# Patient Record
Sex: MALE | Race: WHITE | NOT HISPANIC OR LATINO | URBAN - METROPOLITAN AREA
[De-identification: names, ages, dates, MRNs, and addresses within clinical notes are randomized per-mention and may not be internally consistent; named-entity substitution may affect disease eponyms.]

---

## 2017-12-13 ENCOUNTER — PROBLEM (OUTPATIENT)
Dept: URBAN - METROPOLITAN AREA CLINIC 14 | Facility: CLINIC | Age: 72
End: 2017-12-13

## 2017-12-13 DIAGNOSIS — H43.811: ICD-10-CM

## 2017-12-13 DIAGNOSIS — H35.353: ICD-10-CM

## 2017-12-13 DIAGNOSIS — H35.373: ICD-10-CM

## 2017-12-13 PROCEDURE — 92235 FLUORESCEIN ANGRPH MLTIFRAME: CPT

## 2017-12-13 PROCEDURE — G8427 DOCREV CUR MEDS BY ELIG CLIN: HCPCS

## 2017-12-13 PROCEDURE — 1036F TOBACCO NON-USER: CPT

## 2017-12-13 PROCEDURE — 92134 CPTRZ OPH DX IMG PST SGM RTA: CPT

## 2017-12-13 PROCEDURE — 92014 COMPRE OPH EXAM EST PT 1/>: CPT

## 2017-12-13 PROCEDURE — 92250 FUNDUS PHOTOGRAPHY W/I&R: CPT

## 2017-12-13 PROCEDURE — 92226 OPHTHALMOSCOPY (SUB): CPT

## 2017-12-13 ASSESSMENT — TONOMETRY
OS_IOP_MMHG: 10
OD_IOP_MMHG: 10

## 2017-12-13 ASSESSMENT — VISUAL ACUITY
OS_CC: 20/50
OD_CC: 20/25+2

## 2018-01-10 ENCOUNTER — FOLLOW UP (OUTPATIENT)
Dept: URBAN - METROPOLITAN AREA CLINIC 14 | Facility: CLINIC | Age: 73
End: 2018-01-10

## 2018-01-10 DIAGNOSIS — H43.811: ICD-10-CM

## 2018-01-10 DIAGNOSIS — D31.32: ICD-10-CM

## 2018-01-10 DIAGNOSIS — H43.21: ICD-10-CM

## 2018-01-10 DIAGNOSIS — H35.373: ICD-10-CM

## 2018-01-10 DIAGNOSIS — H35.353: ICD-10-CM

## 2018-01-10 PROCEDURE — 1036F TOBACCO NON-USER: CPT

## 2018-01-10 PROCEDURE — 92226 OPHTHALMOSCOPY (SUB): CPT

## 2018-01-10 PROCEDURE — 92134 CPTRZ OPH DX IMG PST SGM RTA: CPT

## 2018-01-10 PROCEDURE — G8427 DOCREV CUR MEDS BY ELIG CLIN: HCPCS

## 2018-01-10 PROCEDURE — 92014 COMPRE OPH EXAM EST PT 1/>: CPT

## 2018-01-10 ASSESSMENT — VISUAL ACUITY
OS_CC: 20/30
OD_CC: 20/20

## 2018-01-10 ASSESSMENT — TONOMETRY
OS_IOP_MMHG: 16
OD_IOP_MMHG: 12

## 2018-02-14 ENCOUNTER — FOLLOW UP (OUTPATIENT)
Dept: URBAN - METROPOLITAN AREA CLINIC 14 | Facility: CLINIC | Age: 73
End: 2018-02-14

## 2018-02-14 DIAGNOSIS — H35.373: ICD-10-CM

## 2018-02-14 DIAGNOSIS — H35.353: ICD-10-CM

## 2018-02-14 DIAGNOSIS — H43.811: ICD-10-CM

## 2018-02-14 PROCEDURE — 1036F TOBACCO NON-USER: CPT

## 2018-02-14 PROCEDURE — 92014 COMPRE OPH EXAM EST PT 1/>: CPT

## 2018-02-14 PROCEDURE — 92226 OPHTHALMOSCOPY (SUB): CPT

## 2018-02-14 PROCEDURE — G8427 DOCREV CUR MEDS BY ELIG CLIN: HCPCS

## 2018-02-14 PROCEDURE — 92134 CPTRZ OPH DX IMG PST SGM RTA: CPT

## 2018-02-14 ASSESSMENT — TONOMETRY
OD_IOP_MMHG: 11
OS_IOP_MMHG: 13

## 2018-02-14 ASSESSMENT — VISUAL ACUITY
OD_CC: 20/20-1
OS_CC: 20/30-1

## 2018-03-20 ENCOUNTER — FOLLOW UP (OUTPATIENT)
Dept: URBAN - METROPOLITAN AREA CLINIC 14 | Facility: CLINIC | Age: 73
End: 2018-03-20

## 2018-03-20 DIAGNOSIS — H43.811: ICD-10-CM

## 2018-03-20 DIAGNOSIS — H35.373: ICD-10-CM

## 2018-03-20 DIAGNOSIS — H35.352: ICD-10-CM

## 2018-03-20 PROCEDURE — 4040F PNEUMOC VAC/ADMIN/RCVD: CPT

## 2018-03-20 PROCEDURE — 92014 COMPRE OPH EXAM EST PT 1/>: CPT

## 2018-03-20 PROCEDURE — 92134 CPTRZ OPH DX IMG PST SGM RTA: CPT

## 2018-03-20 PROCEDURE — G8427 DOCREV CUR MEDS BY ELIG CLIN: HCPCS

## 2018-03-20 PROCEDURE — 92226 OPHTHALMOSCOPY (SUB): CPT

## 2018-03-20 PROCEDURE — 1036F TOBACCO NON-USER: CPT

## 2018-03-20 ASSESSMENT — VISUAL ACUITY
OD_CC: 20/20
OS_CC: 20/30-2

## 2018-03-20 ASSESSMENT — TONOMETRY
OD_IOP_MMHG: 12
OS_IOP_MMHG: 15

## 2018-05-01 ENCOUNTER — FOLLOW UP (OUTPATIENT)
Dept: URBAN - METROPOLITAN AREA CLINIC 14 | Facility: CLINIC | Age: 73
End: 2018-05-01

## 2018-05-01 DIAGNOSIS — H43.811: ICD-10-CM

## 2018-05-01 DIAGNOSIS — H35.373: ICD-10-CM

## 2018-05-01 DIAGNOSIS — H35.352: ICD-10-CM

## 2018-05-01 PROCEDURE — 92226 OPHTHALMOSCOPY (SUB): CPT

## 2018-05-01 PROCEDURE — 92014 COMPRE OPH EXAM EST PT 1/>: CPT

## 2018-05-01 PROCEDURE — 1036F TOBACCO NON-USER: CPT

## 2018-05-01 PROCEDURE — 92134 CPTRZ OPH DX IMG PST SGM RTA: CPT

## 2018-05-01 PROCEDURE — G8427 DOCREV CUR MEDS BY ELIG CLIN: HCPCS

## 2018-05-01 ASSESSMENT — VISUAL ACUITY
OD_CC: 20/20-2
OS_CC: 20/40

## 2018-05-01 ASSESSMENT — TONOMETRY
OS_IOP_MMHG: 21
OD_IOP_MMHG: 17

## 2018-07-10 ENCOUNTER — FOLLOW UP (OUTPATIENT)
Dept: URBAN - METROPOLITAN AREA CLINIC 14 | Facility: CLINIC | Age: 73
End: 2018-07-10

## 2018-07-10 DIAGNOSIS — H35.352: ICD-10-CM

## 2018-07-10 DIAGNOSIS — H43.811: ICD-10-CM

## 2018-07-10 DIAGNOSIS — H35.373: ICD-10-CM

## 2018-07-10 PROCEDURE — 92014 COMPRE OPH EXAM EST PT 1/>: CPT

## 2018-07-10 PROCEDURE — 92226 OPHTHALMOSCOPY (SUB): CPT

## 2018-07-10 PROCEDURE — 92134 CPTRZ OPH DX IMG PST SGM RTA: CPT

## 2018-07-10 PROCEDURE — G8427 DOCREV CUR MEDS BY ELIG CLIN: HCPCS

## 2018-07-10 PROCEDURE — 1036F TOBACCO NON-USER: CPT

## 2018-07-10 ASSESSMENT — VISUAL ACUITY
OS_CC: 20/30-1
OD_CC: 20/20

## 2018-07-10 ASSESSMENT — TONOMETRY
OS_IOP_MMHG: 18
OD_IOP_MMHG: 09

## 2018-10-02 ENCOUNTER — FOLLOW UP (OUTPATIENT)
Dept: URBAN - METROPOLITAN AREA CLINIC 14 | Facility: CLINIC | Age: 73
End: 2018-10-02

## 2018-10-02 DIAGNOSIS — H43.811: ICD-10-CM

## 2018-10-02 DIAGNOSIS — H35.352: ICD-10-CM

## 2018-10-02 DIAGNOSIS — H35.373: ICD-10-CM

## 2018-10-02 PROCEDURE — G9903 PT SCRN TBCO ID AS NON USER: HCPCS

## 2018-10-02 PROCEDURE — 92014 COMPRE OPH EXAM EST PT 1/>: CPT

## 2018-10-02 PROCEDURE — 92134 CPTRZ OPH DX IMG PST SGM RTA: CPT

## 2018-10-02 PROCEDURE — G8427 DOCREV CUR MEDS BY ELIG CLIN: HCPCS

## 2018-10-02 PROCEDURE — 92226 OPHTHALMOSCOPY (SUB): CPT

## 2018-10-02 PROCEDURE — 1036F TOBACCO NON-USER: CPT

## 2018-10-02 ASSESSMENT — VISUAL ACUITY
OD_CC: 20/20
OS_PH: 20/30-1
OS_CC: 20/40+2

## 2018-10-02 ASSESSMENT — TONOMETRY
OD_IOP_MMHG: 10
OS_IOP_MMHG: 14

## 2019-01-16 ENCOUNTER — FOLLOW UP (OUTPATIENT)
Dept: URBAN - METROPOLITAN AREA CLINIC 51 | Facility: CLINIC | Age: 74
End: 2019-01-16

## 2019-01-16 DIAGNOSIS — H43.811: ICD-10-CM

## 2019-01-16 DIAGNOSIS — D31.32: ICD-10-CM

## 2019-01-16 DIAGNOSIS — H35.373: ICD-10-CM

## 2019-01-16 DIAGNOSIS — H43.21: ICD-10-CM

## 2019-01-16 DIAGNOSIS — H35.352: ICD-10-CM

## 2019-01-16 PROCEDURE — 92014 COMPRE OPH EXAM EST PT 1/>: CPT

## 2019-01-16 PROCEDURE — G9903 PT SCRN TBCO ID AS NON USER: HCPCS

## 2019-01-16 PROCEDURE — 92226 OPHTHALMOSCOPY (SUB): CPT

## 2019-01-16 PROCEDURE — G8427 DOCREV CUR MEDS BY ELIG CLIN: HCPCS

## 2019-01-16 PROCEDURE — 1036F TOBACCO NON-USER: CPT

## 2019-01-16 PROCEDURE — 92134 CPTRZ OPH DX IMG PST SGM RTA: CPT

## 2019-01-16 ASSESSMENT — VISUAL ACUITY
OD_CC: 20/25+2
OS_PH: 20/40-2
OS_CC: 20/50-2

## 2019-01-16 ASSESSMENT — TONOMETRY
OS_IOP_MMHG: 20
OD_IOP_MMHG: 14

## 2019-02-13 ENCOUNTER — PROBLEM (OUTPATIENT)
Dept: URBAN - METROPOLITAN AREA CLINIC 51 | Facility: CLINIC | Age: 74
End: 2019-02-13

## 2019-02-13 DIAGNOSIS — H35.373: ICD-10-CM

## 2019-02-13 DIAGNOSIS — H11.32: ICD-10-CM

## 2019-02-13 DIAGNOSIS — H35.352: ICD-10-CM

## 2019-02-13 PROCEDURE — 92134 CPTRZ OPH DX IMG PST SGM RTA: CPT

## 2019-02-13 PROCEDURE — 92014 COMPRE OPH EXAM EST PT 1/>: CPT

## 2019-02-13 PROCEDURE — 92226 OPHTHALMOSCOPY (SUB): CPT

## 2019-02-13 ASSESSMENT — TONOMETRY
OD_IOP_MMHG: 14
OS_IOP_MMHG: 22
OS_IOP_MMHG: 21

## 2019-02-13 ASSESSMENT — VISUAL ACUITY
OS_CC: 20/40-3
OD_CC: 20/20

## 2019-03-13 ENCOUNTER — FOLLOW UP (OUTPATIENT)
Dept: URBAN - METROPOLITAN AREA CLINIC 51 | Facility: CLINIC | Age: 74
End: 2019-03-13

## 2019-03-13 DIAGNOSIS — H35.373: ICD-10-CM

## 2019-03-13 DIAGNOSIS — H35.352: ICD-10-CM

## 2019-03-13 DIAGNOSIS — H11.32: ICD-10-CM

## 2019-03-13 PROCEDURE — 92014 COMPRE OPH EXAM EST PT 1/>: CPT

## 2019-03-13 PROCEDURE — 92226 OPHTHALMOSCOPY (SUB): CPT

## 2019-03-13 PROCEDURE — 92134 CPTRZ OPH DX IMG PST SGM RTA: CPT

## 2019-03-13 ASSESSMENT — TONOMETRY
OS_IOP_MMHG: 30
OD_IOP_MMHG: 15

## 2019-03-13 ASSESSMENT — VISUAL ACUITY
OD_CC: 20/20
OS_CC: 20/30+2

## 2019-05-03 ENCOUNTER — FOLLOW UP (OUTPATIENT)
Dept: URBAN - METROPOLITAN AREA CLINIC 14 | Facility: CLINIC | Age: 74
End: 2019-05-03

## 2019-05-03 DIAGNOSIS — H35.373: ICD-10-CM

## 2019-05-03 DIAGNOSIS — H35.352: ICD-10-CM

## 2019-05-03 DIAGNOSIS — H43.811: ICD-10-CM

## 2019-05-03 DIAGNOSIS — D31.32: ICD-10-CM

## 2019-05-03 PROCEDURE — 92134 CPTRZ OPH DX IMG PST SGM RTA: CPT

## 2019-05-03 PROCEDURE — 92235 FLUORESCEIN ANGRPH MLTIFRAME: CPT

## 2019-05-03 PROCEDURE — 92014 COMPRE OPH EXAM EST PT 1/>: CPT

## 2019-05-03 PROCEDURE — 92250 FUNDUS PHOTOGRAPHY W/I&R: CPT

## 2019-05-03 PROCEDURE — 92226 OPHTHALMOSCOPY (SUB): CPT

## 2019-05-03 ASSESSMENT — TONOMETRY
OD_IOP_MMHG: 10
OS_IOP_MMHG: 11

## 2019-05-03 ASSESSMENT — VISUAL ACUITY
OS_CC: 20/40+3
OD_CC: 20/20-1
OS_PH: 20/30-1

## 2019-06-04 ENCOUNTER — FOLLOW UP (OUTPATIENT)
Dept: URBAN - METROPOLITAN AREA CLINIC 14 | Facility: CLINIC | Age: 74
End: 2019-06-04

## 2019-06-04 DIAGNOSIS — D31.32: ICD-10-CM

## 2019-06-04 DIAGNOSIS — H35.373: ICD-10-CM

## 2019-06-04 DIAGNOSIS — H35.352: ICD-10-CM

## 2019-06-04 DIAGNOSIS — H43.811: ICD-10-CM

## 2019-06-04 PROCEDURE — 92226 OPHTHALMOSCOPY (SUB): CPT

## 2019-06-04 PROCEDURE — 92134 CPTRZ OPH DX IMG PST SGM RTA: CPT

## 2019-06-04 PROCEDURE — 92014 COMPRE OPH EXAM EST PT 1/>: CPT

## 2019-06-04 ASSESSMENT — TONOMETRY
OS_IOP_MMHG: 13
OD_IOP_MMHG: 14

## 2019-06-04 ASSESSMENT — VISUAL ACUITY
OS_CC: 20/40-1
OD_CC: 20/20-2

## 2019-09-06 ENCOUNTER — FOLLOW UP (OUTPATIENT)
Dept: URBAN - METROPOLITAN AREA CLINIC 14 | Facility: CLINIC | Age: 74
End: 2019-09-06

## 2019-09-06 DIAGNOSIS — H35.373: ICD-10-CM

## 2019-09-06 DIAGNOSIS — H35.352: ICD-10-CM

## 2019-09-06 DIAGNOSIS — D31.32: ICD-10-CM

## 2019-09-06 DIAGNOSIS — H43.811: ICD-10-CM

## 2019-09-06 PROCEDURE — 92226 OPHTHALMOSCOPY (SUB): CPT

## 2019-09-06 PROCEDURE — 92014 COMPRE OPH EXAM EST PT 1/>: CPT

## 2019-09-06 PROCEDURE — 92134 CPTRZ OPH DX IMG PST SGM RTA: CPT

## 2019-09-06 ASSESSMENT — TONOMETRY
OS_IOP_MMHG: 10
OD_IOP_MMHG: 12

## 2019-09-06 ASSESSMENT — VISUAL ACUITY
OS_CC: 20/60-1
OD_CC: 20/20-1

## 2019-09-24 ENCOUNTER — FOLLOW UP (OUTPATIENT)
Dept: URBAN - METROPOLITAN AREA CLINIC 14 | Facility: CLINIC | Age: 74
End: 2019-09-24

## 2019-09-24 DIAGNOSIS — H35.352: ICD-10-CM

## 2019-09-24 DIAGNOSIS — H35.373: ICD-10-CM

## 2019-09-24 DIAGNOSIS — H43.811: ICD-10-CM

## 2019-09-24 PROCEDURE — 92226 OPHTHALMOSCOPY (SUB): CPT

## 2019-09-24 PROCEDURE — 92134 CPTRZ OPH DX IMG PST SGM RTA: CPT

## 2019-09-24 PROCEDURE — 92014 COMPRE OPH EXAM EST PT 1/>: CPT

## 2019-09-24 ASSESSMENT — VISUAL ACUITY
OD_CC: 20/20
OS_PH: 20/40
OS_CC: 20/50

## 2019-09-24 ASSESSMENT — TONOMETRY
OS_IOP_MMHG: 10
OD_IOP_MMHG: 11

## 2019-11-05 ENCOUNTER — FOLLOW UP (OUTPATIENT)
Dept: URBAN - METROPOLITAN AREA CLINIC 14 | Facility: CLINIC | Age: 74
End: 2019-11-05

## 2019-11-05 DIAGNOSIS — H35.371: ICD-10-CM

## 2019-11-05 DIAGNOSIS — H35.352: ICD-10-CM

## 2019-11-05 DIAGNOSIS — D31.32: ICD-10-CM

## 2019-11-05 DIAGNOSIS — H43.811: ICD-10-CM

## 2019-11-05 PROCEDURE — 92134 CPTRZ OPH DX IMG PST SGM RTA: CPT

## 2019-11-05 PROCEDURE — 92226 OPHTHALMOSCOPY (SUB): CPT

## 2019-11-05 PROCEDURE — 92014 COMPRE OPH EXAM EST PT 1/>: CPT

## 2019-11-05 ASSESSMENT — VISUAL ACUITY
OS_PH: 20/40+1
OS_CC: 20/50-2
OD_CC: 20/20-1

## 2019-11-05 ASSESSMENT — TONOMETRY
OS_IOP_MMHG: 8
OD_IOP_MMHG: 8

## 2020-02-07 ENCOUNTER — FOLLOW UP (OUTPATIENT)
Dept: URBAN - METROPOLITAN AREA CLINIC 14 | Facility: CLINIC | Age: 75
End: 2020-02-07

## 2020-02-07 DIAGNOSIS — H35.353: ICD-10-CM

## 2020-02-07 DIAGNOSIS — D31.32: ICD-10-CM

## 2020-02-07 DIAGNOSIS — H43.811: ICD-10-CM

## 2020-02-07 DIAGNOSIS — H35.373: ICD-10-CM

## 2020-02-07 DIAGNOSIS — H43.21: ICD-10-CM

## 2020-02-07 PROCEDURE — 92202 OPSCPY EXTND ON/MAC DRAW: CPT

## 2020-02-07 PROCEDURE — 92014 COMPRE OPH EXAM EST PT 1/>: CPT

## 2020-02-07 PROCEDURE — 92134 CPTRZ OPH DX IMG PST SGM RTA: CPT

## 2020-02-07 ASSESSMENT — VISUAL ACUITY
OD_CC: 20/20-1
OS_CC: 20/30+2

## 2020-02-07 ASSESSMENT — TONOMETRY
OD_IOP_MMHG: 11
OS_IOP_MMHG: 12

## 2020-03-03 ENCOUNTER — FOLLOW UP (OUTPATIENT)
Dept: URBAN - METROPOLITAN AREA CLINIC 14 | Facility: CLINIC | Age: 75
End: 2020-03-03

## 2020-03-03 DIAGNOSIS — H43.811: ICD-10-CM

## 2020-03-03 DIAGNOSIS — H35.353: ICD-10-CM

## 2020-03-03 DIAGNOSIS — H35.373: ICD-10-CM

## 2020-03-03 PROCEDURE — 92134 CPTRZ OPH DX IMG PST SGM RTA: CPT

## 2020-03-03 PROCEDURE — 92202 OPSCPY EXTND ON/MAC DRAW: CPT

## 2020-03-03 PROCEDURE — 92014 COMPRE OPH EXAM EST PT 1/>: CPT

## 2020-03-03 ASSESSMENT — VISUAL ACUITY
OD_CC: 20/20
OS_CC: 20/40+1

## 2020-03-03 ASSESSMENT — TONOMETRY
OS_IOP_MMHG: 12
OD_IOP_MMHG: 11

## 2020-05-26 ENCOUNTER — FOLLOW UP (OUTPATIENT)
Dept: URBAN - METROPOLITAN AREA CLINIC 14 | Facility: CLINIC | Age: 75
End: 2020-05-26

## 2020-05-26 DIAGNOSIS — H43.811: ICD-10-CM

## 2020-05-26 DIAGNOSIS — D31.32: ICD-10-CM

## 2020-05-26 DIAGNOSIS — H35.373: ICD-10-CM

## 2020-05-26 DIAGNOSIS — H35.353: ICD-10-CM

## 2020-05-26 PROCEDURE — 92202 OPSCPY EXTND ON/MAC DRAW: CPT

## 2020-05-26 PROCEDURE — 92134 CPTRZ OPH DX IMG PST SGM RTA: CPT

## 2020-05-26 PROCEDURE — 92014 COMPRE OPH EXAM EST PT 1/>: CPT

## 2020-05-26 ASSESSMENT — VISUAL ACUITY
OS_CC: 20/40+2
OD_PH: 20/25
OD_CC: 20/25-1

## 2020-05-26 ASSESSMENT — TONOMETRY
OS_IOP_MMHG: 12
OD_IOP_MMHG: 12

## 2020-08-28 ENCOUNTER — FOLLOW UP (OUTPATIENT)
Dept: URBAN - METROPOLITAN AREA CLINIC 14 | Facility: CLINIC | Age: 75
End: 2020-08-28

## 2020-08-28 DIAGNOSIS — H35.373: ICD-10-CM

## 2020-08-28 DIAGNOSIS — H35.353: ICD-10-CM

## 2020-08-28 DIAGNOSIS — H43.811: ICD-10-CM

## 2020-08-28 PROCEDURE — 92134 CPTRZ OPH DX IMG PST SGM RTA: CPT

## 2020-08-28 PROCEDURE — 92202 OPSCPY EXTND ON/MAC DRAW: CPT

## 2020-08-28 PROCEDURE — 92014 COMPRE OPH EXAM EST PT 1/>: CPT

## 2020-08-28 ASSESSMENT — VISUAL ACUITY
OD_CC: 20/20+2
OS_CC: 20/40+1
OS_PH: 20/30+2

## 2020-08-28 ASSESSMENT — TONOMETRY
OS_IOP_MMHG: 14
OD_IOP_MMHG: 14

## 2023-05-02 PROBLEM — Z00.00 ENCOUNTER FOR PREVENTIVE HEALTH EXAMINATION: Status: ACTIVE | Noted: 2023-05-02

## 2023-05-03 ENCOUNTER — APPOINTMENT (OUTPATIENT)
Dept: UROLOGY | Facility: CLINIC | Age: 78
End: 2023-05-03
Payer: MEDICARE

## 2023-05-03 DIAGNOSIS — Z80.7 FAMILY HISTORY OF OTHER MALIGNANT NEOPLASMS OF LYMPHOID, HEMATOPOIETIC AND RELATED TISSUES: ICD-10-CM

## 2023-05-03 DIAGNOSIS — E78.00 PURE HYPERCHOLESTEROLEMIA, UNSPECIFIED: ICD-10-CM

## 2023-05-03 DIAGNOSIS — G47.30 SLEEP APNEA, UNSPECIFIED: ICD-10-CM

## 2023-05-03 DIAGNOSIS — G25.81 RESTLESS LEGS SYNDROME: ICD-10-CM

## 2023-05-03 DIAGNOSIS — I48.91 UNSPECIFIED ATRIAL FIBRILLATION: ICD-10-CM

## 2023-05-03 DIAGNOSIS — Z80.3 FAMILY HISTORY OF MALIGNANT NEOPLASM OF BREAST: ICD-10-CM

## 2023-05-03 DIAGNOSIS — M47.816 SPONDYLOSIS W/OUT MYELOPATHY OR RADICULOPATHY, LUMBAR REGION: ICD-10-CM

## 2023-05-03 DIAGNOSIS — I10 ESSENTIAL (PRIMARY) HYPERTENSION: ICD-10-CM

## 2023-05-03 PROCEDURE — 99203 OFFICE O/P NEW LOW 30 MIN: CPT

## 2023-05-03 RX ORDER — PRAMIPEXOLE DIHYDROCHLORIDE 0.25 MG/1
0.25 TABLET ORAL
Refills: 0 | Status: ACTIVE | COMMUNITY

## 2023-05-03 RX ORDER — ACETAMINOPHEN 325 MG
TABLET ORAL
Refills: 0 | Status: ACTIVE | COMMUNITY

## 2023-05-03 RX ORDER — TAMSULOSIN HYDROCHLORIDE 0.4 MG/1
0.4 CAPSULE ORAL
Refills: 0 | Status: ACTIVE | COMMUNITY

## 2023-05-03 RX ORDER — TESTOSTERONE 20.25 MG/1.25G
1.62 GEL, METERED TRANSDERMAL
Refills: 0 | Status: ACTIVE | COMMUNITY

## 2023-05-03 RX ORDER — CETIRIZINE HCL 10 MG
10 TABLET ORAL
Refills: 0 | Status: ACTIVE | COMMUNITY

## 2023-05-03 RX ORDER — RIVAROXABAN 20 MG/1
20 TABLET, FILM COATED ORAL
Refills: 0 | Status: ACTIVE | COMMUNITY

## 2023-05-03 RX ORDER — LOSARTAN POTASSIUM 50 MG/1
50 TABLET, FILM COATED ORAL
Refills: 0 | Status: ACTIVE | COMMUNITY

## 2023-05-03 RX ORDER — GABAPENTIN 600 MG/1
600 TABLET, COATED ORAL
Refills: 0 | Status: ACTIVE | COMMUNITY

## 2023-05-03 RX ORDER — SOTALOL HYDROCHLORIDE 80 MG/1
80 TABLET ORAL
Refills: 0 | Status: ACTIVE | COMMUNITY

## 2023-05-03 RX ORDER — ROSUVASTATIN CALCIUM 10 MG/1
10 TABLET, FILM COATED ORAL
Refills: 0 | Status: ACTIVE | COMMUNITY

## 2023-05-03 NOTE — ASSESSMENT
[FreeTextEntry1] : Patient is a 78 year male who presents with erectile dysfunction.  He has not been sexually active for over 5 years but is now in a relationship and is interested in treatment for ED.  He is not on nitrates.  \par no associated pain.  no associated symptoms.  no modifying factors.\par PSA 1.4 in 2023\par KARTHIKEYAN and exam normal today\par \par A/P\par 1. erectile dysfunction\par Viagra 100 mg prn \par side effects and usage discussion and understood by patient \par 2. prostate cancer screening \par normal \par office in 2 months

## 2023-05-03 NOTE — PHYSICAL EXAM
[General Appearance - Well Developed] : well developed [Normal Appearance] : normal appearance [General Appearance - In No Acute Distress] : no acute distress [Respiration, Rhythm And Depth] : normal respiratory rhythm and effort [Abdomen Soft] : soft [Abdomen Hernia] : no hernia was discovered [Urethral Meatus] : meatus normal [Penis Abnormality] : normal circumcised penis [Scrotum] : the scrotum was normal [Epididymis] : the epididymides were normal [Testes Tenderness] : no tenderness of the testes [Testes Mass (___cm)] : there were no testicular masses [Anus Abnormality] : the anus and perineum were normal [Rectal Exam - Rectum] : digital rectal exam was normal [Prostate Tenderness] : the prostate was not tender [No Prostate Nodules] : no prostate nodules [Prostate Size ___ gm] : prostate size [unfilled] gm [] : no rash [Oriented To Time, Place, And Person] : oriented to person, place, and time [Inguinal Lymph Nodes Enlarged Bilaterally] : inguinal

## 2023-07-26 ENCOUNTER — APPOINTMENT (OUTPATIENT)
Dept: UROLOGY | Facility: CLINIC | Age: 78
End: 2023-07-26
Payer: MEDICARE

## 2023-07-26 VITALS
WEIGHT: 170 LBS | DIASTOLIC BLOOD PRESSURE: 52 MMHG | SYSTOLIC BLOOD PRESSURE: 92 MMHG | HEART RATE: 69 BPM | OXYGEN SATURATION: 92 % | TEMPERATURE: 98 F | BODY MASS INDEX: 25.18 KG/M2 | HEIGHT: 69 IN

## 2023-07-26 PROCEDURE — 99213 OFFICE O/P EST LOW 20 MIN: CPT

## 2023-07-26 RX ORDER — SILDENAFIL 100 MG/1
100 TABLET, FILM COATED ORAL
Qty: 30 | Refills: 11 | Status: ACTIVE | COMMUNITY
Start: 2023-05-03 | End: 1900-01-01

## 2023-07-28 NOTE — ASSESSMENT
[FreeTextEntry1] : Patient is a 78-year-old male who presented last month with erectile dysfunction and for prostate cancer screening.  His digital rectal exam was normal and his PSA was 1.4 in June 2022.  He has done well with the sildenafil 100 mg as needed.  He has noted some difficulty ejaculating but otherwise is doing well.  No interval hematuria, hematospermia or pelvic pain.\par \par Assessment plan\par 1.  Erectile dysfunction\par We will continue sildenafil 100 mg as needed and he will follow-up in 1 year for refills and a digital rectal exam.

## 2024-06-03 ENCOUNTER — APPOINTMENT (OUTPATIENT)
Dept: UROLOGY | Facility: CLINIC | Age: 79
End: 2024-06-03
Payer: MEDICARE

## 2024-06-03 VITALS
DIASTOLIC BLOOD PRESSURE: 75 MMHG | BODY MASS INDEX: 25.18 KG/M2 | WEIGHT: 170 LBS | SYSTOLIC BLOOD PRESSURE: 147 MMHG | HEIGHT: 69 IN

## 2024-06-03 DIAGNOSIS — N40.0 BENIGN PROSTATIC HYPERPLASIA WITHOUT LOWER URINARY TRACT SYMPMS: ICD-10-CM

## 2024-06-03 DIAGNOSIS — R32 UNSPECIFIED URINARY INCONTINENCE: ICD-10-CM

## 2024-06-03 DIAGNOSIS — N52.9 MALE ERECTILE DYSFUNCTION, UNSPECIFIED: ICD-10-CM

## 2024-06-03 PROCEDURE — 99214 OFFICE O/P EST MOD 30 MIN: CPT

## 2024-06-03 RX ORDER — SILDENAFIL 100 MG/1
100 TABLET, FILM COATED ORAL
Qty: 30 | Refills: 6 | Status: ACTIVE | COMMUNITY
Start: 2024-05-02 | End: 1900-01-01

## 2024-06-03 RX ORDER — TAMSULOSIN HYDROCHLORIDE 0.4 MG/1
0.4 CAPSULE ORAL
Qty: 90 | Refills: 3 | Status: ACTIVE | COMMUNITY
Start: 2024-06-03 | End: 1900-01-01

## 2024-06-03 NOTE — ASSESSMENT
[FreeTextEntry1] : Patient is a 79 year male who presents with BPH and erectile dysfunction. He is currently on tamsulosin 0.4 mg p.o. daily for the BPH and sildenafil 100 mg as needed for his erectile dysfunction with generally good results.  He has had 2 episodes of enuresis over the last 6 months associated with sleeping very soundly. no associated pain. no associated symptoms. no modifying factors. PSA 1.4 in 2023 KARTHIKEYAN and exam normal today  A/P  1. erectile dysfunction Continue Viagra 100 mg prn 2. prostate cancer screening KARTHIKEYAN normal today 3.  BPH with obstruction Continue tamsulosin 0.4 mg p.o. daily.  As far as the enuresis is concerned I have told him to restrict his fluid intake 2-1/2 to 3 hours before going to bed at night and make sure he empties his bladder very well before going to bed.  If this does not work I told him to increase his tamsulosin to 0.8 mg daily and make a follow-up with me.  office in 12 months.

## 2025-03-06 ENCOUNTER — APPOINTMENT (OUTPATIENT)
Dept: UROLOGY | Facility: CLINIC | Age: 80
End: 2025-03-06

## 2025-06-09 ENCOUNTER — APPOINTMENT (OUTPATIENT)
Dept: UROLOGY | Facility: CLINIC | Age: 80
End: 2025-06-09

## (undated) RX ORDER — BROMFENAC SODIUM 0.7 MG/ML: 1 SOLUTION/ DROPS OPHTHALMIC TWICE A DAY

## (undated) RX ORDER — FLUOROMETHOLONE 1 MG/ML
1 SUSPENSION/ DROPS OPHTHALMIC
Start: 2019-09-06

## (undated) RX ORDER — LOTEPREDNOL ETABONATE 5 MG/ML: 1 SUSPENSION/ DROPS OPHTHALMIC TWICE A DAY

## (undated) RX ORDER — BROMFENAC SODIUM 0.7 MG/ML: 1 SOLUTION/ DROPS OPHTHALMIC ONCE A DAY

## (undated) RX ORDER — DUREZOL 0.5 MG/ML: 1 EMULSION OPHTHALMIC TWICE A DAY

## (undated) RX ORDER — PREDNISOLONE ACETATE 10 MG/ML: 1 SUSPENSION/ DROPS OPHTHALMIC ONCE A DAY

## (undated) RX ORDER — PREDNISOLONE ACETATE 10 MG/ML: 1 SUSPENSION/ DROPS OPHTHALMIC